# Patient Record
Sex: FEMALE | Race: WHITE | NOT HISPANIC OR LATINO | URBAN - METROPOLITAN AREA
[De-identification: names, ages, dates, MRNs, and addresses within clinical notes are randomized per-mention and may not be internally consistent; named-entity substitution may affect disease eponyms.]

---

## 2020-03-10 ENCOUNTER — EMERGENCY (EMERGENCY)
Facility: HOSPITAL | Age: 25
LOS: 1 days | Discharge: HOME | End: 2020-03-10
Attending: EMERGENCY MEDICINE | Admitting: EMERGENCY MEDICINE
Payer: COMMERCIAL

## 2020-03-10 ENCOUNTER — EMERGENCY (EMERGENCY)
Facility: HOSPITAL | Age: 25
LOS: 1 days | Discharge: ANOTHER HOSPITAL | End: 2020-03-10
Attending: EMERGENCY MEDICINE | Admitting: EMERGENCY MEDICINE
Payer: COMMERCIAL

## 2020-03-10 VITALS
HEART RATE: 76 BPM | RESPIRATION RATE: 18 BRPM | TEMPERATURE: 98 F | OXYGEN SATURATION: 99 % | SYSTOLIC BLOOD PRESSURE: 101 MMHG | DIASTOLIC BLOOD PRESSURE: 67 MMHG

## 2020-03-10 VITALS
DIASTOLIC BLOOD PRESSURE: 68 MMHG | TEMPERATURE: 98 F | HEIGHT: 70 IN | WEIGHT: 130.07 LBS | RESPIRATION RATE: 18 BRPM | OXYGEN SATURATION: 99 % | HEART RATE: 94 BPM | SYSTOLIC BLOOD PRESSURE: 112 MMHG

## 2020-03-10 VITALS
HEIGHT: 70 IN | RESPIRATION RATE: 17 BRPM | SYSTOLIC BLOOD PRESSURE: 113 MMHG | OXYGEN SATURATION: 100 % | WEIGHT: 130.07 LBS | DIASTOLIC BLOOD PRESSURE: 73 MMHG | HEART RATE: 103 BPM | TEMPERATURE: 98 F

## 2020-03-10 DIAGNOSIS — N93.9 ABNORMAL UTERINE AND VAGINAL BLEEDING, UNSPECIFIED: ICD-10-CM

## 2020-03-10 DIAGNOSIS — R55 SYNCOPE AND COLLAPSE: ICD-10-CM

## 2020-03-10 DIAGNOSIS — D64.9 ANEMIA, UNSPECIFIED: ICD-10-CM

## 2020-03-10 LAB
ANION GAP SERPL CALC-SCNC: 5 MMOL/L — LOW (ref 9–16)
APTT BLD: 26.4 SEC — LOW (ref 27.5–36.3)
BASOPHILS # BLD AUTO: 0.01 K/UL — SIGNIFICANT CHANGE UP (ref 0–0.2)
BASOPHILS # BLD AUTO: 0.03 K/UL — SIGNIFICANT CHANGE UP (ref 0–0.2)
BASOPHILS NFR BLD AUTO: 0.1 % — SIGNIFICANT CHANGE UP (ref 0–2)
BASOPHILS NFR BLD AUTO: 0.3 % — SIGNIFICANT CHANGE UP (ref 0–2)
BUN SERPL-MCNC: 15 MG/DL — SIGNIFICANT CHANGE UP (ref 7–23)
CALCIUM SERPL-MCNC: 9.3 MG/DL — SIGNIFICANT CHANGE UP (ref 8.5–10.5)
CHLORIDE SERPL-SCNC: 103 MMOL/L — SIGNIFICANT CHANGE UP (ref 96–108)
CO2 SERPL-SCNC: 29 MMOL/L — SIGNIFICANT CHANGE UP (ref 22–31)
CREAT SERPL-MCNC: 0.64 MG/DL — SIGNIFICANT CHANGE UP (ref 0.5–1.3)
EOSINOPHIL # BLD AUTO: 0 K/UL — SIGNIFICANT CHANGE UP (ref 0–0.5)
EOSINOPHIL # BLD AUTO: 0.05 K/UL — SIGNIFICANT CHANGE UP (ref 0–0.5)
EOSINOPHIL NFR BLD AUTO: 0 % — SIGNIFICANT CHANGE UP (ref 0–6)
EOSINOPHIL NFR BLD AUTO: 0.6 % — SIGNIFICANT CHANGE UP (ref 0–6)
GLUCOSE SERPL-MCNC: 97 MG/DL — SIGNIFICANT CHANGE UP (ref 70–99)
HCG SERPL-ACNC: <1 MIU/ML — SIGNIFICANT CHANGE UP
HCT VFR BLD CALC: 22.8 % — LOW (ref 34.5–45)
HCT VFR BLD CALC: 23.8 % — LOW (ref 34.5–45)
HCT VFR BLD CALC: 25.8 % — LOW (ref 34.5–45)
HGB BLD-MCNC: 7.7 G/DL — LOW (ref 11.5–15.5)
HGB BLD-MCNC: 7.8 G/DL — LOW (ref 11.5–15.5)
HGB BLD-MCNC: 8.6 G/DL — LOW (ref 11.5–15.5)
IMM GRANULOCYTES NFR BLD AUTO: 0.3 % — SIGNIFICANT CHANGE UP (ref 0–1.5)
IMM GRANULOCYTES NFR BLD AUTO: 0.4 % — SIGNIFICANT CHANGE UP (ref 0–1.5)
INR BLD: 1.01 — SIGNIFICANT CHANGE UP (ref 0.88–1.16)
LYMPHOCYTES # BLD AUTO: 1.16 K/UL — SIGNIFICANT CHANGE UP (ref 1–3.3)
LYMPHOCYTES # BLD AUTO: 11.6 % — LOW (ref 13–44)
LYMPHOCYTES # BLD AUTO: 2.75 K/UL — SIGNIFICANT CHANGE UP (ref 1–3.3)
LYMPHOCYTES # BLD AUTO: 35.4 % — SIGNIFICANT CHANGE UP (ref 13–44)
MCHC RBC-ENTMCNC: 30.6 PG — SIGNIFICANT CHANGE UP (ref 27–34)
MCHC RBC-ENTMCNC: 30.8 PG — SIGNIFICANT CHANGE UP (ref 27–34)
MCHC RBC-ENTMCNC: 31.5 PG — SIGNIFICANT CHANGE UP (ref 27–34)
MCHC RBC-ENTMCNC: 32.4 GM/DL — SIGNIFICANT CHANGE UP (ref 32–36)
MCHC RBC-ENTMCNC: 33.3 GM/DL — SIGNIFICANT CHANGE UP (ref 32–36)
MCHC RBC-ENTMCNC: 34.2 GM/DL — SIGNIFICANT CHANGE UP (ref 32–36)
MCV RBC AUTO: 91.9 FL — SIGNIFICANT CHANGE UP (ref 80–100)
MCV RBC AUTO: 92.5 FL — SIGNIFICANT CHANGE UP (ref 80–100)
MCV RBC AUTO: 94.4 FL — SIGNIFICANT CHANGE UP (ref 80–100)
MONOCYTES # BLD AUTO: 0.34 K/UL — SIGNIFICANT CHANGE UP (ref 0–0.9)
MONOCYTES # BLD AUTO: 0.46 K/UL — SIGNIFICANT CHANGE UP (ref 0–0.9)
MONOCYTES NFR BLD AUTO: 4.4 % — SIGNIFICANT CHANGE UP (ref 2–14)
MONOCYTES NFR BLD AUTO: 4.6 % — SIGNIFICANT CHANGE UP (ref 2–14)
NEUTROPHILS # BLD AUTO: 4.59 K/UL — SIGNIFICANT CHANGE UP (ref 1.8–7.4)
NEUTROPHILS # BLD AUTO: 8.32 K/UL — HIGH (ref 1.8–7.4)
NEUTROPHILS NFR BLD AUTO: 59.2 % — SIGNIFICANT CHANGE UP (ref 43–77)
NEUTROPHILS NFR BLD AUTO: 83.1 % — HIGH (ref 43–77)
NRBC # BLD: 0 /100 WBCS — SIGNIFICANT CHANGE UP (ref 0–0)
PLATELET # BLD AUTO: 292 K/UL — SIGNIFICANT CHANGE UP (ref 150–400)
PLATELET # BLD AUTO: 323 K/UL — SIGNIFICANT CHANGE UP (ref 150–400)
PLATELET # BLD AUTO: 344 K/UL — SIGNIFICANT CHANGE UP (ref 150–400)
POTASSIUM SERPL-MCNC: 4.4 MMOL/L — SIGNIFICANT CHANGE UP (ref 3.5–5.3)
POTASSIUM SERPL-SCNC: 4.4 MMOL/L — SIGNIFICANT CHANGE UP (ref 3.5–5.3)
PROTHROM AB SERPL-ACNC: 11.2 SEC — SIGNIFICANT CHANGE UP (ref 10–12.9)
RBC # BLD: 2.48 M/UL — LOW (ref 3.8–5.2)
RBC # BLD: 2.52 M/UL — LOW (ref 3.8–5.2)
RBC # BLD: 2.79 M/UL — LOW (ref 3.8–5.2)
RBC # FLD: 12.2 % — SIGNIFICANT CHANGE UP (ref 10.3–14.5)
RBC # FLD: 12.2 % — SIGNIFICANT CHANGE UP (ref 10.3–14.5)
RBC # FLD: 12.4 % — SIGNIFICANT CHANGE UP (ref 10.3–14.5)
SODIUM SERPL-SCNC: 137 MMOL/L — SIGNIFICANT CHANGE UP (ref 132–145)
TROPONIN I SERPL-MCNC: <0.017 NG/ML — LOW (ref 0.02–0.06)
WBC # BLD: 10.01 K/UL — SIGNIFICANT CHANGE UP (ref 3.8–10.5)
WBC # BLD: 7.76 K/UL — SIGNIFICANT CHANGE UP (ref 3.8–10.5)
WBC # BLD: 9.67 K/UL — SIGNIFICANT CHANGE UP (ref 3.8–10.5)
WBC # FLD AUTO: 10.01 K/UL — SIGNIFICANT CHANGE UP (ref 3.8–10.5)
WBC # FLD AUTO: 7.76 K/UL — SIGNIFICANT CHANGE UP (ref 3.8–10.5)
WBC # FLD AUTO: 9.67 K/UL — SIGNIFICANT CHANGE UP (ref 3.8–10.5)

## 2020-03-10 PROCEDURE — 99285 EMERGENCY DEPT VISIT HI MDM: CPT

## 2020-03-10 PROCEDURE — 36415 COLL VENOUS BLD VENIPUNCTURE: CPT

## 2020-03-10 PROCEDURE — 86850 RBC ANTIBODY SCREEN: CPT

## 2020-03-10 PROCEDURE — 99284 EMERGENCY DEPT VISIT MOD MDM: CPT | Mod: 25

## 2020-03-10 PROCEDURE — 93010 ELECTROCARDIOGRAM REPORT: CPT

## 2020-03-10 PROCEDURE — 93005 ELECTROCARDIOGRAM TRACING: CPT

## 2020-03-10 PROCEDURE — 86901 BLOOD TYPING SEROLOGIC RH(D): CPT

## 2020-03-10 PROCEDURE — 85025 COMPLETE CBC W/AUTO DIFF WBC: CPT

## 2020-03-10 PROCEDURE — 99284 EMERGENCY DEPT VISIT MOD MDM: CPT

## 2020-03-10 PROCEDURE — 76856 US EXAM PELVIC COMPLETE: CPT | Mod: 26

## 2020-03-10 RX ORDER — SODIUM CHLORIDE 9 MG/ML
1000 INJECTION INTRAMUSCULAR; INTRAVENOUS; SUBCUTANEOUS ONCE
Refills: 0 | Status: COMPLETED | OUTPATIENT
Start: 2020-03-10 | End: 2020-03-10

## 2020-03-10 RX ADMIN — SODIUM CHLORIDE 1000 MILLILITER(S): 9 INJECTION INTRAMUSCULAR; INTRAVENOUS; SUBCUTANEOUS at 16:52

## 2020-03-10 NOTE — ED ADULT TRIAGE NOTE - CHIEF COMPLAINT QUOTE
transfer from [facility] for ob gyn consults, hx of  last 2019 having bleeding since then

## 2020-03-10 NOTE — ED ADULT NURSE NOTE - OBJECTIVE STATEMENT
23 yo F c.o heavy vaginal bleeding x 8 days and syncopal episodes. Pt states "I think I fainted or had a seizure three times. I hav my menstural cycle and it has been heavy and I have big blood clots in it and my cycle is not usually like this. I fainted at work on saturday then again in the shower at my boyfriends house on Saturday night then again waiting on line today at the coffee shop and when that happened a nurse was there and said I had a seizure so I came in". Pt denies CP, SOB,n/v/d/f/chills at this time. Pt denies history of seizures. pt states prior to all the episodes she started feeling very hot so sat on the floor.

## 2020-03-10 NOTE — CONSULT NOTE ADULT - ASSESSMENT
A/P: 25yo  with LMP 3/02 and h/o recent medical terminal of pregnancy at 7wks in December who now presents for heavy vaginal bleeding. A/P: 23yo  with LMP 3/02 and h/o recent medical terminal of pregnancy at 7wks in December who now presents for heavy vaginal bleeding, now resolved. VSS, Hb 8.6, repeat 7.8 and now 7.7, stable. She no longer is c/o feeling lightheaded/dizziness or heavy vaginal bleeding. Urine pregnancy test negative, TVUS with low concern for retained POC. She is clinically and hemodynamically stable for discharge home with OCPs, Aviane 30mcg. She should follow up with [facility] in 1-2 weeks at [facility] [address] [place] [address], [address] ([phone]). She is to return to the ED sooner for heavy vaginal bleeding, pelvic pain/cramping, or recurrent syncopal episode.    Patient seen by Cierra Arshad PGY2  d/w Dr Lemus
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
## 2020-03-10 NOTE — ED PROVIDER NOTE - CLINICAL SUMMARY MEDICAL DECISION MAKING FREE TEXT BOX
25 y/o F pt who is not pregnant with PMHx of vasovagal syncope and anemia  presents to ED with 2 episodes of vasovagal syncope and vaginal bleeding, likely dysfunctional uterine bleeding. Pt's US showed 7mm endometrial thickness. GYN is aware, no immediate plans to transfuse. Pt's VS stable except for mild tachycardia at 94. 25 y/o F pt who is not pregnant with PMHx of vasovagal syncope and anemia  presents to ED with 2 episodes of vasovagal syncope and vaginal bleeding, likely dysfunctional uterine bleeding. Pt's US showed 7mm endometrial thickness. GYN is aware, no immediate plans to transfuse. Pt's VS stable except for mild tachycardia at 94. Will re-do EKG.

## 2020-03-10 NOTE — ED PROVIDER NOTE - PATIENT PORTAL LINK FT
You can access the FollowMyHealth Patient Portal offered by [facility] by registering at the following website: [email or web address]. By joining [facility]’s FollowMyHealth portal, you will also be able to view your health information using other applications (apps) compatible with our system.

## 2020-03-10 NOTE — ED PROVIDER NOTE - NSFOLLOWUPINSTRUCTIONS_ED_ALL_ED_FT
take iron pills, birth control pills as prescribed, follow up with gyn clinic, return immediately for any worsening or concerning symptoms  Dysfunctional Uterine Bleeding  Dysfunctional uterine bleeding is abnormal bleeding from the uterus. Dysfunctional uterine bleeding includes:  A menstrual period that comes earlier or later than usual.A menstrual period that is lighter or heavier than usual, or has large blood clots.Vaginal bleeding between menstrual periods.Skipping one or more menstrual periods.Vaginal bleeding after sex.Vaginal bleeding after menopause.Follow these instructions at home:  Eating and drinking   Eat well-balanced meals. Include foods that are high in iron, such as liver, meat, shellfish, green leafy vegetables, and eggs.To prevent or treat constipation, your health care provider may recommend that you:  Drink enough fluid to keep your urine pale yellow.Take over-the-counter or prescription medicines.Eat foods that are high in fiber, such as beans, whole grains, and fresh fruits and vegetables.Limit foods that are high in fat and processed sugars, such as fried or sweet foods.Medicines   Take over-the-counter and prescription medicines only as told by your health care provider.Do not change medicines without talking with your health care provider.Aspirin or medicines that contain aspirin may make the bleeding worse. Do not take those medicines:  During the week before your menstrual period.During your menstrual period.If you were prescribed iron pills, take them as told by your health care provider. Iron pills help to replace iron that your body loses because of this condition.Activity   If you need to change your sanitary pad or tampon more than one time every 2 hours:  Lie in bed with your feet raised (elevated).Place a cold pack on your lower abdomen.Rest as much as possible until the bleeding stops or slows down.Do not try to lose weight until the bleeding has stopped and your blood iron level is back to normal.General instructions   For two months, write down:  When your menstrual period starts.When your menstrual period ends.When any abnormal vaginal bleeding occurs.What problems you notice.Keep all follow up visits as told by your health care provider. This is important.Contact a health care provider if you:  Feel light-headed or weak.Have nausea and vomiting.Cannot eat or drink without vomiting.Feel dizzy or have diarrhea while you are taking medicines.Are taking birth control pills or hormones, and you want to change them or stop taking them.Get help right away if:  You develop a fever or chills.You need to change your sanitary pad or tampon more than one time per hour.Your vaginal bleeding becomes heavier, or your flow contains clots more often.You develop pain in your abdomen.You lose consciousness.You develop a rash.Summary  Dysfunctional uterine bleeding is abnormal bleeding from the uterus.It includes menstrual bleeding of abnormal duration, volume, or regularity.Bleeding after sex and after menopause are also considered dysfunctional uterine bleeding.

## 2020-03-10 NOTE — ED PROVIDER NOTE - CARE PROVIDER_API CALL
Ac Corey)  Obstetrics and Gynecology  [address] [place] [address]  Phone: [phone]  Fax: [phone]  Follow Up Time:     Rahul Lemus)  Obstetrics and Gynecology  [address] [place] [address]  Phone: [phone]  Fax: [phone]  Follow Up Time:

## 2020-03-10 NOTE — ED PROVIDER NOTE - CARE PLAN
Principal Discharge DX:	Vaginal bleeding  Secondary Diagnosis:	Symptomatic anemia  Secondary Diagnosis:	Syncope and collapse

## 2020-03-10 NOTE — ED PROVIDER NOTE - CARE PROVIDERS DIRECT ADDRESSES
[email or web address]

## 2020-03-10 NOTE — ED ADULT NURSE NOTE - CHPI ED NUR SYMPTOMS NEG
no back pain/no vaginal discharge/no vomiting/no discharge/no nausea/no pain/no coffee grounds emesis/no abdominal pain/no fever
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
## 2020-03-10 NOTE — ED PROVIDER NOTE - CHPI ED SYMPTOMS NEG
no dizziness/no fever/no vomiting/no chills/no pain/no visual changes, no abdominal pain, no chest pain, no shortness of breath, no cough, no palpitations, no urinary sxs, no headache/no nausea

## 2020-03-10 NOTE — ED PROVIDER NOTE - CLINICAL SUMMARY MEDICAL DECISION MAKING FREE TEXT BOX
25 y/o F with hx of anemia and vasovagal syncope presents to the ED s/p syncope c/o heavy vaginal bleeding x9 days. Pt denies pain or other sxs at this time. Will order EKG, labs, transvaginal and pelvic US and reassess. 25 y/o F with hx of anemia and vasovagal syncope presents to the ED s/p syncope c/o heavy vaginal bleeding x9 days. Pt denies pain or other sxs at this time. Will order EKG, labs, transvaginal and pelvic US and reassess.    No retained POC, hemodynamically stable in ED, hydrated with 1L, large clots present in vaginal vault w/out active bleeding visualized from cervix, no vaginal wall lesions, no free fluid in pelvis, soft ntnd abdomen -- H&H dropping, pt becoming lightheaded upon standing, d/w GYN, will transfer ED to ED for official GYN consultation. Dr. Griffiths of [facility] ED aware.
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
## 2020-03-10 NOTE — ED ADULT NURSE NOTE - NSIMPLEMENTINTERV_GEN_ALL_ED
Implemented All Fall Risk Interventions:  [place] to call system. Call bell, personal items and telephone within reach. Instruct patient to call for assistance. Room bathroom lighting operational. Non-slip footwear when patient is off stretcher. Physically safe environment: no spills, clutter or unnecessary equipment. Stretcher in lowest position, wheels locked, appropriate side rails in place. Provide visual cue, wrist band, yellow gown, etc. Monitor gait and stability. Monitor for mental status changes and reorient to person, place, and time. Review medications for side effects contributing to fall risk. Reinforce activity limits and safety measures with patient and family.

## 2020-03-10 NOTE — ED PROVIDER NOTE - PROGRESS NOTE DETAILS
troy: pt received at sign out from dr garcia as dub w/anemia - syncopal episode, here for gyn eval -- seen and cleared for dc w/ocps and outpatient f/u, pt hemodynamically stable, wanting to go home

## 2020-03-10 NOTE — ED PROVIDER NOTE - ATTENDING CONTRIBUTION TO CARE
I have seen the pt, reviewed all pertinent clinical data, and I agree with the documentation/care/plan executed by CECILIA Greer.

## 2020-03-10 NOTE — ED ADULT NURSE NOTE - OBJECTIVE STATEMENT
pt a&ox3 resting comfortably in stretcher. boyfriend at bedside. transferred from [facility]. pt reports  dec 2019. reports recovered well and had a normal period after. pt now on  day of heavy period, reports ability to saturate pads every 5 minutes to every 30 mins. reports weakness and dizziness, fainting 3 times this week, was able to lower herself to floor or was caught by someone standing by. denies hitting head. denies pain. denies cp, sob, n ,v d, fevers.

## 2020-03-10 NOTE — ED ADULT NURSE NOTE - CHPI ED NUR SYMPTOMS NEG
no vomiting/no dizziness/no pain/no tingling/no weakness/no nausea/no chills/no decreased eating/drinking/no fever

## 2020-03-10 NOTE — ED ADULT NURSE REASSESSMENT NOTE - NS ED NURSE REASSESS COMMENT FT1
Pt laying in bed, not c/o any s/s at this time. Pt is awaiting transport to [facility] ED, report given to charge RN. Will continue to monitor. Side rails up, safety is maintained.

## 2020-03-10 NOTE — ED PROVIDER NOTE - OBJECTIVE STATEMENT
25 y/o F pt with PMHx of anemia and vasovagal syncope, and no significant PSHx presents to ED transferred from [facility] for vaginal bleeding x9 days and 2 episodes of syncope. Per pt's boyfriend, pt hit her head in the shower on 3/7 and also today about 4-5hours ago. Pt denies tongue bite, urinary incontinence, headache, gross evidence of head trauma, chest pain, shortness of breath, dizziness, current active bleeding, or any other acute complaints. Pt relates taking meds from [facility] 2 months ago to induce an . Pt's labs from [facility] were H&H 8.6, dropped to 7.8 3 hours later, HCG negative, and electrolytes normal.

## 2020-03-11 VITALS
SYSTOLIC BLOOD PRESSURE: 114 MMHG | OXYGEN SATURATION: 100 % | TEMPERATURE: 98 F | RESPIRATION RATE: 18 BRPM | HEART RATE: 70 BPM | DIASTOLIC BLOOD PRESSURE: 72 MMHG
